# Patient Record
Sex: FEMALE | Race: WHITE
[De-identification: names, ages, dates, MRNs, and addresses within clinical notes are randomized per-mention and may not be internally consistent; named-entity substitution may affect disease eponyms.]

---

## 2018-02-01 ENCOUNTER — HOSPITAL ENCOUNTER (OUTPATIENT)
Dept: HOSPITAL 53 - M WHC | Age: 83
End: 2018-02-01
Attending: INTERNAL MEDICINE
Payer: COMMERCIAL

## 2018-02-01 DIAGNOSIS — Z78.0: ICD-10-CM

## 2018-02-01 DIAGNOSIS — Z12.31: Primary | ICD-10-CM

## 2018-02-01 DIAGNOSIS — Z80.3: ICD-10-CM

## 2018-02-01 PROCEDURE — 77067 SCR MAMMO BI INCL CAD: CPT

## 2020-05-29 ENCOUNTER — HOSPITAL ENCOUNTER (OUTPATIENT)
Dept: HOSPITAL 53 - M WUC | Age: 85
End: 2020-05-29
Attending: INTERNAL MEDICINE
Payer: COMMERCIAL

## 2020-05-29 DIAGNOSIS — R63.4: Primary | ICD-10-CM

## 2020-05-30 NOTE — REP
REASON:  Weight loss.

 

COMPARISON:  01/16/2012, the latest prior.

 

Cardiomediastinal silhouette is unchanged. The heart is borderline to mildly

enlarged. The pleural angles are sharp. The lung fields are stable. No acute

patchy parenchymal opacities or pleural effusions have developed. The lung fields

are hyperexpanded. There is no change in the osseous structures.

 

IMPRESSION:

 

Stable chronic changes without evidence of acute cardiopulmonary disease.

 

 

Electronically Signed by

Manjit Penny DO 06/01/2020 09:54 A

## 2022-05-25 ENCOUNTER — HOSPITAL ENCOUNTER (OUTPATIENT)
Dept: HOSPITAL 53 - SKLAB6 | Age: 87
End: 2022-05-25
Attending: INTERNAL MEDICINE
Payer: MEDICARE

## 2022-05-25 DIAGNOSIS — E05.90: ICD-10-CM

## 2022-05-25 DIAGNOSIS — I10: Primary | ICD-10-CM

## 2022-05-25 DIAGNOSIS — R73.01: ICD-10-CM

## 2022-05-25 LAB
ALBUMIN SERPL BCG-MCNC: 3.4 GM/DL (ref 3.2–5.2)
ALT SERPL W P-5'-P-CCNC: 18 U/L (ref 12–78)
BILIRUB SERPL-MCNC: 1.1 MG/DL (ref 0.2–1)
BUN SERPL-MCNC: 16 MG/DL (ref 7–18)
CALCIUM SERPL-MCNC: 9.9 MG/DL (ref 8.8–10.2)
CHLORIDE SERPL-SCNC: 107 MEQ/L (ref 98–107)
CO2 SERPL-SCNC: 30 MEQ/L (ref 21–32)
CREAT SERPL-MCNC: 0.69 MG/DL (ref 0.55–1.3)
EST. AVERAGE GLUCOSE BLD GHB EST-MCNC: 111 MG/DL (ref 60–110)
GFR SERPL CREATININE-BSD FRML MDRD: > 60 ML/MIN/{1.73_M2} (ref 32–?)
GLUCOSE SERPL-MCNC: 105 MG/DL (ref 70–100)
POTASSIUM SERPL-SCNC: 4.5 MEQ/L (ref 3.5–5.1)
PROT SERPL-MCNC: 6.7 GM/DL (ref 6.4–8.2)
SODIUM SERPL-SCNC: 144 MEQ/L (ref 136–145)
T4 FREE SERPL-MCNC: 1.02 NG/DL (ref 0.76–1.46)
TSH SERPL DL<=0.005 MIU/L-ACNC: 1.57 UIU/ML (ref 0.36–3.74)

## 2022-07-05 ENCOUNTER — HOSPITAL ENCOUNTER (OUTPATIENT)
Dept: HOSPITAL 53 - SKLAB6 | Age: 87
End: 2022-07-05
Attending: INTERNAL MEDICINE
Payer: MEDICARE

## 2022-07-05 DIAGNOSIS — I10: Primary | ICD-10-CM

## 2022-07-05 LAB
BUN SERPL-MCNC: 14 MG/DL (ref 7–18)
CALCIUM SERPL-MCNC: 9.4 MG/DL (ref 8.8–10.2)
CHLORIDE SERPL-SCNC: 108 MEQ/L (ref 98–107)
CO2 SERPL-SCNC: 28 MEQ/L (ref 21–32)
CREAT SERPL-MCNC: 0.7 MG/DL (ref 0.55–1.3)
GFR SERPL CREATININE-BSD FRML MDRD: > 60 ML/MIN/{1.73_M2} (ref 32–?)
GLUCOSE SERPL-MCNC: 97 MG/DL (ref 70–100)
POTASSIUM SERPL-SCNC: 4.1 MEQ/L (ref 3.5–5.1)
SODIUM SERPL-SCNC: 143 MEQ/L (ref 136–145)

## 2022-08-28 ENCOUNTER — HOSPITAL ENCOUNTER (EMERGENCY)
Dept: HOSPITAL 53 - M ED | Age: 87
Discharge: HOME | End: 2022-08-28
Payer: MEDICARE

## 2022-08-28 VITALS — SYSTOLIC BLOOD PRESSURE: 142 MMHG | DIASTOLIC BLOOD PRESSURE: 128 MMHG

## 2022-08-28 VITALS — SYSTOLIC BLOOD PRESSURE: 130 MMHG | DIASTOLIC BLOOD PRESSURE: 57 MMHG

## 2022-08-28 VITALS — WEIGHT: 147.71 LBS | BODY MASS INDEX: 23.18 KG/M2 | HEIGHT: 67 IN

## 2022-08-28 VITALS — SYSTOLIC BLOOD PRESSURE: 221 MMHG | DIASTOLIC BLOOD PRESSURE: 107 MMHG

## 2022-08-28 VITALS — DIASTOLIC BLOOD PRESSURE: 61 MMHG | SYSTOLIC BLOOD PRESSURE: 130 MMHG

## 2022-08-28 VITALS — SYSTOLIC BLOOD PRESSURE: 145 MMHG | DIASTOLIC BLOOD PRESSURE: 76 MMHG

## 2022-08-28 VITALS — DIASTOLIC BLOOD PRESSURE: 108 MMHG | SYSTOLIC BLOOD PRESSURE: 158 MMHG

## 2022-08-28 DIAGNOSIS — N28.1: ICD-10-CM

## 2022-08-28 DIAGNOSIS — I50.9: ICD-10-CM

## 2022-08-28 DIAGNOSIS — I63.521: ICD-10-CM

## 2022-08-28 DIAGNOSIS — I48.91: ICD-10-CM

## 2022-08-28 DIAGNOSIS — I51.7: ICD-10-CM

## 2022-08-28 DIAGNOSIS — Z98.51: ICD-10-CM

## 2022-08-28 DIAGNOSIS — Z87.891: ICD-10-CM

## 2022-08-28 DIAGNOSIS — I65.21: Primary | ICD-10-CM

## 2022-08-28 DIAGNOSIS — E78.5: ICD-10-CM

## 2022-08-28 DIAGNOSIS — F32.A: ICD-10-CM

## 2022-08-28 DIAGNOSIS — K76.89: ICD-10-CM

## 2022-08-28 DIAGNOSIS — J90: ICD-10-CM

## 2022-08-28 DIAGNOSIS — F41.9: ICD-10-CM

## 2022-08-28 DIAGNOSIS — G30.9: ICD-10-CM

## 2022-08-28 DIAGNOSIS — I63.511: ICD-10-CM

## 2022-08-28 DIAGNOSIS — R91.8: ICD-10-CM

## 2022-08-28 LAB
APTT BLD: 22.9 SECONDS (ref 25.9–37)
BASE EXCESS BLDA CALC-SCNC: -5.2 MMOL/L (ref -2–2)
BASOPHILS # BLD AUTO: 0.1 10^3/UL (ref 0–0.2)
BASOPHILS NFR BLD AUTO: 0.5 % (ref 0–1)
BUN SERPL-MCNC: 23 MG/DL (ref 7–18)
CALCIUM SERPL-MCNC: 9.4 MG/DL (ref 8.8–10.2)
CHLORIDE SERPL-SCNC: 103 MEQ/L (ref 98–107)
CK MB CFR.DF SERPL CALC: 3.91
CK MB SERPL-MCNC: 2.5 NG/ML (ref ?–3.6)
CK SERPL-CCNC: 64 U/L (ref 26–192)
CO2 BLDA CALC-SCNC: 24.2 MEQ/L (ref 23–31)
CO2 SERPL-SCNC: 28 MEQ/L (ref 21–32)
CREAT SERPL-MCNC: 0.86 MG/DL (ref 0.55–1.3)
EOSINOPHIL # BLD AUTO: 0.2 10^3/UL (ref 0–0.5)
EOSINOPHIL NFR BLD AUTO: 1.8 % (ref 0–3)
GFR SERPL CREATININE-BSD FRML MDRD: > 60 ML/MIN/{1.73_M2} (ref 32–?)
GLUCOSE SERPL-MCNC: 204 MG/DL (ref 70–100)
HCO3 BLDA-SCNC: 22.6 MEQ/L (ref 22–26)
HCO3 STD BLDA-SCNC: 20.2 MEQ/L (ref 22–26)
HCT VFR BLD AUTO: 44 % (ref 36–47)
HGB BLD-MCNC: 14 G/DL (ref 12–15.5)
INR PPP: 0.96
LYMPHOCYTES # BLD AUTO: 3 10^3/UL (ref 1.5–5)
LYMPHOCYTES NFR BLD AUTO: 22.9 % (ref 24–44)
MCH RBC QN AUTO: 28.6 PG (ref 27–33)
MCHC RBC AUTO-ENTMCNC: 31.8 G/DL (ref 32–36.5)
MCV RBC AUTO: 90 FL (ref 80–96)
MONOCYTES # BLD AUTO: 0.8 10^3/UL (ref 0–0.8)
MONOCYTES NFR BLD AUTO: 6.3 % (ref 2–8)
NEUTROPHILS # BLD AUTO: 8.8 10^3/UL (ref 1.5–8.5)
NEUTROPHILS NFR BLD AUTO: 68 % (ref 36–66)
PCO2 BLDA: 53.1 MMHG (ref 35–45)
PH BLDA: 7.25 UNITS (ref 7.35–7.45)
PLATELET # BLD AUTO: 391 10^3/UL (ref 150–450)
PO2 BLDA: 144.9 MMHG (ref 75–100)
POTASSIUM SERPL-SCNC: 4.4 MEQ/L (ref 3.5–5.1)
PROTHROMBIN TIME: 13.2 SECONDS (ref 12.7–14.5)
RBC # BLD AUTO: 4.89 10^6/UL (ref 4–5.4)
RSV RNA NPH QL NAA+PROBE: NEGATIVE
SAO2 % BLDA: 98.5 % (ref 95–99)
SODIUM SERPL-SCNC: 139 MEQ/L (ref 136–145)
WBC # BLD AUTO: 12.9 10^3/UL (ref 4–10)

## 2022-08-28 PROCEDURE — 82553 CREATINE MB FRACTION: CPT

## 2022-08-28 PROCEDURE — 84484 ASSAY OF TROPONIN QUANT: CPT

## 2022-08-28 PROCEDURE — 36600 WITHDRAWAL OF ARTERIAL BLOOD: CPT

## 2022-08-28 PROCEDURE — 99285 EMERGENCY DEPT VISIT HI MDM: CPT

## 2022-08-28 PROCEDURE — 80047 BASIC METABLC PNL IONIZED CA: CPT

## 2022-08-28 PROCEDURE — 85025 COMPLETE CBC W/AUTO DIFF WBC: CPT

## 2022-08-28 PROCEDURE — 82803 BLOOD GASES ANY COMBINATION: CPT

## 2022-08-28 PROCEDURE — 70496 CT ANGIOGRAPHY HEAD: CPT

## 2022-08-28 PROCEDURE — 85730 THROMBOPLASTIN TIME PARTIAL: CPT

## 2022-08-28 PROCEDURE — 82550 ASSAY OF CK (CPK): CPT

## 2022-08-28 PROCEDURE — 96366 THER/PROPH/DIAG IV INF ADDON: CPT

## 2022-08-28 PROCEDURE — 93041 RHYTHM ECG TRACING: CPT

## 2022-08-28 PROCEDURE — 83605 ASSAY OF LACTIC ACID: CPT

## 2022-08-28 PROCEDURE — 96365 THER/PROPH/DIAG IV INF INIT: CPT

## 2022-08-28 PROCEDURE — 93005 ELECTROCARDIOGRAM TRACING: CPT

## 2022-08-28 PROCEDURE — 80048 BASIC METABOLIC PNL TOTAL CA: CPT

## 2022-08-28 PROCEDURE — 71250 CT THORAX DX C-: CPT

## 2022-08-28 PROCEDURE — 87631 RESP VIRUS 3-5 TARGETS: CPT

## 2022-08-28 PROCEDURE — 70498 CT ANGIOGRAPHY NECK: CPT

## 2022-08-28 PROCEDURE — 70450 CT HEAD/BRAIN W/O DYE: CPT

## 2022-08-28 PROCEDURE — 94760 N-INVAS EAR/PLS OXIMETRY 1: CPT

## 2022-08-28 PROCEDURE — 96375 TX/PRO/DX INJ NEW DRUG ADDON: CPT

## 2022-08-28 PROCEDURE — 85610 PROTHROMBIN TIME: CPT
